# Patient Record
Sex: MALE | Race: WHITE
[De-identification: names, ages, dates, MRNs, and addresses within clinical notes are randomized per-mention and may not be internally consistent; named-entity substitution may affect disease eponyms.]

---

## 2022-02-01 ENCOUNTER — HOSPITAL ENCOUNTER (OUTPATIENT)
Dept: HOSPITAL 79 - KOH-I | Age: 52
End: 2022-02-01
Payer: COMMERCIAL

## 2022-02-01 DIAGNOSIS — G89.29: ICD-10-CM

## 2022-02-01 DIAGNOSIS — M51.16: Primary | ICD-10-CM

## 2022-02-07 ENCOUNTER — HOSPITAL ENCOUNTER (OUTPATIENT)
Dept: HOSPITAL 79 - OPSV2 | Age: 52
End: 2022-02-07
Attending: ORTHOPAEDIC SURGERY
Payer: COMMERCIAL

## 2022-02-07 DIAGNOSIS — Z01.818: Primary | ICD-10-CM

## 2022-02-07 DIAGNOSIS — M51.16: ICD-10-CM

## 2022-02-07 LAB
BUN/CREATININE RATIO: 14 (ref 0–10)
HGB BLD-MCNC: 15.6 GM/DL (ref 14–17.5)
RED BLOOD COUNT: 5.26 M/UL (ref 4.2–5.5)
WHITE BLOOD COUNT: 8.8 K/UL (ref 4.5–11)

## 2022-02-13 ENCOUNTER — HOSPITAL ENCOUNTER (OUTPATIENT)
Dept: HOSPITAL 79 - LAB | Age: 52
End: 2022-02-13
Attending: ORTHOPAEDIC SURGERY
Payer: COMMERCIAL

## 2022-02-13 DIAGNOSIS — M48.061: ICD-10-CM

## 2022-02-13 DIAGNOSIS — M51.16: ICD-10-CM

## 2022-02-13 DIAGNOSIS — Z01.812: Primary | ICD-10-CM

## 2022-02-13 LAB — BUN/CREATININE RATIO: 18 (ref 0–10)

## 2022-02-14 ENCOUNTER — HOSPITAL ENCOUNTER (INPATIENT)
Dept: HOSPITAL 79 - OR | Age: 52
LOS: 2 days | Discharge: HOME | DRG: 455 | End: 2022-02-16
Attending: ORTHOPAEDIC SURGERY | Admitting: ORTHOPAEDIC SURGERY
Payer: COMMERCIAL

## 2022-02-14 VITALS — WEIGHT: 235 LBS | HEIGHT: 69 IN | BODY MASS INDEX: 34.8 KG/M2

## 2022-02-14 DIAGNOSIS — E11.40: ICD-10-CM

## 2022-02-14 DIAGNOSIS — Z88.6: ICD-10-CM

## 2022-02-14 DIAGNOSIS — Z87.891: ICD-10-CM

## 2022-02-14 DIAGNOSIS — M48.061: Primary | ICD-10-CM

## 2022-02-14 DIAGNOSIS — M51.36: ICD-10-CM

## 2022-02-14 DIAGNOSIS — E11.9: ICD-10-CM

## 2022-02-14 DIAGNOSIS — M54.16: ICD-10-CM

## 2022-02-14 DIAGNOSIS — I10: ICD-10-CM

## 2022-02-14 DIAGNOSIS — K44.9: ICD-10-CM

## 2022-02-14 DIAGNOSIS — Z88.2: ICD-10-CM

## 2022-02-14 DIAGNOSIS — Z79.4: ICD-10-CM

## 2022-02-14 DIAGNOSIS — Z98.890: ICD-10-CM

## 2022-02-14 DIAGNOSIS — Z83.3: ICD-10-CM

## 2022-02-14 DIAGNOSIS — Z82.49: ICD-10-CM

## 2022-02-14 DIAGNOSIS — Z20.822: ICD-10-CM

## 2022-02-14 DIAGNOSIS — M51.87: ICD-10-CM

## 2022-02-14 DIAGNOSIS — Z88.0: ICD-10-CM

## 2022-02-14 LAB
BUN/CREATININE RATIO: 15 (ref 0–10)
HGB BLD-MCNC: 13.2 GM/DL (ref 14–17.5)
RED BLOOD COUNT: 4.31 M/UL (ref 4.2–5.5)
WHITE BLOOD COUNT: 11.2 K/UL (ref 4.5–11)

## 2022-02-14 PROCEDURE — C1713 ANCHOR/SCREW BN/BN,TIS/BN: HCPCS

## 2022-02-14 PROCEDURE — 01NB0ZZ RELEASE LUMBAR NERVE, OPEN APPROACH: ICD-10-PCS | Performed by: ORTHOPAEDIC SURGERY

## 2022-02-14 PROCEDURE — 0SG3071 FUSION OF LUMBOSACRAL JOINT WITH AUTOLOGOUS TISSUE SUBSTITUTE, POSTERIOR APPROACH, POSTERIOR COLUMN, OPEN APPROACH: ICD-10-PCS | Performed by: ORTHOPAEDIC SURGERY

## 2022-02-14 PROCEDURE — 01NR0ZZ RELEASE SACRAL NERVE, OPEN APPROACH: ICD-10-PCS | Performed by: ORTHOPAEDIC SURGERY

## 2022-02-14 PROCEDURE — 0SG30AJ FUSION OF LUMBOSACRAL JOINT WITH INTERBODY FUSION DEVICE, POSTERIOR APPROACH, ANTERIOR COLUMN, OPEN APPROACH: ICD-10-PCS | Performed by: ORTHOPAEDIC SURGERY

## 2022-02-14 PROCEDURE — 4A11X4G MONITORING OF PERIPHERAL NERVOUS ELECTRICAL ACTIVITY, INTRAOPERATIVE, EXTERNAL APPROACH: ICD-10-PCS | Performed by: ORTHOPAEDIC SURGERY

## 2022-02-14 PROCEDURE — C1762 CONN TISS, HUMAN(INC FASCIA): HCPCS

## 2022-02-15 LAB
BUN/CREATININE RATIO: 16 (ref 0–10)
HGB BLD-MCNC: 12.5 GM/DL (ref 14–17.5)
RED BLOOD COUNT: 4.03 M/UL (ref 4.2–5.5)
WHITE BLOOD COUNT: 11.4 K/UL (ref 4.5–11)

## 2022-02-16 LAB
BUN/CREATININE RATIO: 17 (ref 0–10)
HGB BLD-MCNC: 12.2 GM/DL (ref 14–17.5)
RED BLOOD COUNT: 4.04 M/UL (ref 4.2–5.5)
WHITE BLOOD COUNT: 11 K/UL (ref 4.5–11)

## 2022-05-10 ENCOUNTER — OFFICE VISIT (OUTPATIENT)
Dept: SURGERY | Facility: CLINIC | Age: 52
End: 2022-05-10

## 2022-05-10 VITALS
BODY MASS INDEX: 34.96 KG/M2 | DIASTOLIC BLOOD PRESSURE: 96 MMHG | HEART RATE: 80 BPM | WEIGHT: 236 LBS | HEIGHT: 69 IN | SYSTOLIC BLOOD PRESSURE: 138 MMHG

## 2022-05-10 DIAGNOSIS — K21.9 GASTROESOPHAGEAL REFLUX DISEASE, UNSPECIFIED WHETHER ESOPHAGITIS PRESENT: Primary | ICD-10-CM

## 2022-05-10 PROCEDURE — 99243 OFF/OP CNSLTJ NEW/EST LOW 30: CPT | Performed by: SURGERY

## 2022-05-10 RX ORDER — PANTOPRAZOLE SODIUM 40 MG/1
40 TABLET, DELAYED RELEASE ORAL DAILY
Qty: 30 TABLET | Refills: 1 | Status: SHIPPED | OUTPATIENT
Start: 2022-05-10 | End: 2023-05-10

## 2022-05-10 RX ORDER — INSULIN GLARGINE 100 [IU]/ML
INJECTION, SOLUTION SUBCUTANEOUS
COMMUNITY
Start: 2022-03-21

## 2022-05-10 RX ORDER — LISINOPRIL 10 MG/1
TABLET ORAL
COMMUNITY
Start: 2022-04-20

## 2022-05-10 NOTE — PROGRESS NOTES
Subjective   Philip Santoyo is a 52 y.o. male is being seen for consultation today at the request of Jah Cancino MD    Philip Santoyo is a 52 y.o. male With concern for possible hiatal hernia.  No outpatient endoscopy or imaging is available or has been reported by the patient.  He smokes currently and is on no PPI therapy.  He complains of chest pain when coughing in the subcostal regions.  No dysphagia reported.    Hiatal Hernia  Associated symptoms include coughing. Pertinent negatives include no abdominal pain, chest pain, chills, fever, headaches, joint swelling, nausea, rash, sore throat, vomiting or weakness.       History reviewed. No pertinent past medical history.    History reviewed. No pertinent family history.    Social History     Socioeconomic History   • Marital status:    Tobacco Use   • Smoking status: Current Every Day Smoker     Packs/day: 0.50     Types: Cigarettes   • Smokeless tobacco: Never Used   Vaping Use   • Vaping Use: Never used   Substance and Sexual Activity   • Alcohol use: Never   • Drug use: Never       History reviewed. No pertinent surgical history.    Review of Systems   Constitutional: Negative for activity change, appetite change, chills and fever.   HENT: Negative for sore throat and trouble swallowing.    Eyes: Negative for visual disturbance.   Respiratory: Positive for cough. Negative for shortness of breath.    Cardiovascular: Negative for chest pain and palpitations.   Gastrointestinal: Negative for abdominal distention, abdominal pain, blood in stool, constipation, diarrhea, nausea and vomiting.   Endocrine: Negative for cold intolerance and heat intolerance.   Genitourinary: Negative for dysuria.   Musculoskeletal: Negative for joint swelling.   Skin: Negative for color change, rash and wound.   Allergic/Immunologic: Negative for immunocompromised state.   Neurological: Negative for dizziness, seizures, weakness and headaches.   Hematological: Negative for  "adenopathy. Does not bruise/bleed easily.   Psychiatric/Behavioral: Negative for agitation and confusion.         /96   Pulse 80   Ht 69 cm (27.17\")   Wt 107 kg (236 lb)   .85 kg/m²   Objective   Physical Exam  Constitutional:       Appearance: He is well-developed.   HENT:      Head: Normocephalic and atraumatic.   Eyes:      Conjunctiva/sclera: Conjunctivae normal.      Pupils: Pupils are equal, round, and reactive to light.   Neck:      Thyroid: No thyromegaly.      Vascular: No JVD.      Trachea: No tracheal deviation.   Cardiovascular:      Rate and Rhythm: Normal rate and regular rhythm.      Heart sounds: No murmur heard.    No friction rub. No gallop.   Pulmonary:      Effort: Pulmonary effort is normal.      Breath sounds: Normal breath sounds.   Abdominal:      General: There is no distension.      Palpations: Abdomen is soft. There is no hepatomegaly or splenomegaly.      Tenderness: There is no abdominal tenderness.      Hernia: No hernia is present.   Musculoskeletal:         General: No deformity. Normal range of motion.      Cervical back: Neck supple.   Skin:     General: Skin is warm and dry.   Neurological:      Mental Status: He is alert and oriented to person, place, and time.               Assessment   Diagnoses and all orders for this visit:    1. Gastroesophageal reflux disease, unspecified whether esophagitis present (Primary)  -     FL Esophagram Complete Single Contrast    Other orders  -     pantoprazole (Protonix) 40 MG EC tablet; Take 1 tablet by mouth Daily.  Dispense: 30 tablet; Refill: 1      Philip Santoyo is a 52 y.o. male with substernal pain with coughing and reflux.  The patient does smoke and has been counseled on encouraged on smoking cessation.  He will obtain esophagram and initiate PPI therapy.  Follow-up after imaging               "

## 2022-05-23 ENCOUNTER — HOSPITAL ENCOUNTER (OUTPATIENT)
Dept: GENERAL RADIOLOGY | Facility: HOSPITAL | Age: 52
Discharge: HOME | End: 2022-05-23
Admitting: SURGERY

## 2022-05-23 PROCEDURE — 74220 X-RAY XM ESOPHAGUS 1CNTRST: CPT

## 2022-05-23 PROCEDURE — 74220 X-RAY XM ESOPHAGUS 1CNTRST: CPT | Performed by: RADIOLOGY

## 2022-05-25 ENCOUNTER — OFFICE VISIT (OUTPATIENT)
Dept: SURGERY | Facility: CLINIC | Age: 52
End: 2022-05-25

## 2022-05-25 VITALS — WEIGHT: 236 LBS | BODY MASS INDEX: 34.96 KG/M2 | HEIGHT: 69 IN

## 2022-05-25 DIAGNOSIS — R10.30 LOWER ABDOMINAL PAIN: Primary | ICD-10-CM

## 2022-05-25 PROCEDURE — 99213 OFFICE O/P EST LOW 20 MIN: CPT

## 2022-05-25 NOTE — PROGRESS NOTES
Subjective   Philip Santoyo is a 52 y.o. male who presents today for Follow Up    Chief Complaint:    Chief Complaint   Patient presents with   • Post-op Follow-up     2 WK FOLLOW-UP         History of Present Illness:    History of Present Illness Philip is a 52-year-old male who presents for a follow-up visit.  Patient was seen by Dr. Olivia several weeks ago and started on a PPI and had an esophagram, which was normal.  Patient states that he does not have any issues with reflux.  He reports that he was originally sent here by his primary care provider for abdominal pain to his left lower quadrant and reports that he may have a hernia.  Patient states that he has cut back on smoking and is still smoking some, however, he has decreased smoking significantly.  Patient states that he has had pain consistently.  He denies any nausea or vomiting.  He states that the pain is not worse after eating.    The following portions of the patient's history were reviewed and updated as appropriate: allergies, current medications, past family history, past medical history, past social history, past surgical history and problem list.    Past Medical History:  History reviewed. No pertinent past medical history.    Social History:  Social History     Socioeconomic History   • Marital status:    Tobacco Use   • Smoking status: Current Every Day Smoker     Packs/day: 0.50     Types: Cigarettes   • Smokeless tobacco: Never Used   Vaping Use   • Vaping Use: Never used   Substance and Sexual Activity   • Alcohol use: Never   • Drug use: Never       Family History:  History reviewed. No pertinent family history.    Past Surgical History:  No past surgical history on file.    Problem List:  Patient Active Problem List   Diagnosis   • Gastroesophageal reflux disease       Allergy:   Allergies   Allergen Reactions   • Penicillins Hives   • Sulfa Antibiotics Hives        Current Medications:   Current Outpatient Medications    Medication Sig Dispense Refill   • Lantus SoloStar 100 UNIT/ML injection pen      • lisinopril (PRINIVIL,ZESTRIL) 10 MG tablet      • pantoprazole (Protonix) 40 MG EC tablet Take 1 tablet by mouth Daily. 30 tablet 1     No current facility-administered medications for this visit.       Review of Systems:    Review of Systems   Constitutional: Negative for activity change.   HENT: Negative for trouble swallowing.    Eyes: Negative for blurred vision.   Respiratory: Negative for shortness of breath.    Cardiovascular: Negative for chest pain.   Gastrointestinal: Positive for abdominal pain.   Endocrine: Negative for cold intolerance.   Genitourinary: Negative for flank pain.   Musculoskeletal: Negative for arthralgias.   Skin: Negative for bruise.   Allergic/Immunologic: Negative for environmental allergies.   Neurological: Negative for confusion.   Hematological: Negative for adenopathy.   Psychiatric/Behavioral: Negative for agitation.         Physical Exam:   Physical Exam  Constitutional:       Appearance: Normal appearance.   HENT:      Head: Normocephalic.      Right Ear: External ear normal.      Left Ear: External ear normal.      Nose: Nose normal.      Mouth/Throat:      Pharynx: Oropharynx is clear.   Eyes:      Pupils: Pupils are equal, round, and reactive to light.   Cardiovascular:      Rate and Rhythm: Normal rate and regular rhythm.      Pulses: Normal pulses.   Pulmonary:      Effort: Pulmonary effort is normal.   Abdominal:      General: Abdomen is flat.      Palpations: Abdomen is soft.   Musculoskeletal:         General: Normal range of motion.      Cervical back: Normal range of motion and neck supple.   Skin:     General: Skin is warm and dry.      Capillary Refill: Capillary refill takes less than 2 seconds.   Neurological:      Mental Status: He is alert and oriented to person, place, and time.   Psychiatric:         Mood and Affect: Mood normal.         Behavior: Behavior normal.  "        Vitals:  Height 175.3 cm (69\"), weight 107 kg (236 lb).   Body mass index is 34.85 kg/m².     Lab Results:   No results found for any previous visit.       Imaging:         Assessment & Plan   Diagnoses and all orders for this visit:    1. Lower abdominal pain (Primary)  -     CT Abdomen Pelvis Without Contrast; Future    We will obtain an CT of the abdomen and pelvis without contrast and follow-up in 2 weeks.  If scan has not been done within 2 weeks patient will reschedule appointment.  He agrees with plan.    Visit Diagnoses:    ICD-10-CM ICD-9-CM   1. Lower abdominal pain  R10.30 789.09         MEDS ORDERED DURING VISIT:  No orders of the defined types were placed in this encounter.      Return in about 2 weeks (around 6/8/2022).             This document has been electronically signed by HAMZAH Jaffe  May 25, 2022 13:24 EDT    Please note that portions of this note were completed with a voice recognition program.   "

## 2022-06-01 ENCOUNTER — HOSPITAL ENCOUNTER (OUTPATIENT)
Dept: CT IMAGING | Facility: HOSPITAL | Age: 52
Discharge: HOME | End: 2022-06-01

## 2022-06-01 DIAGNOSIS — R10.30 LOWER ABDOMINAL PAIN: ICD-10-CM

## 2022-06-01 PROCEDURE — 74176 CT ABD & PELVIS W/O CONTRAST: CPT

## 2022-06-01 PROCEDURE — 74176 CT ABD & PELVIS W/O CONTRAST: CPT | Performed by: RADIOLOGY

## 2022-06-08 ENCOUNTER — OFFICE VISIT (OUTPATIENT)
Dept: SURGERY | Facility: CLINIC | Age: 52
End: 2022-06-08

## 2022-06-08 VITALS — WEIGHT: 236 LBS | BODY MASS INDEX: 34.96 KG/M2 | HEIGHT: 69 IN

## 2022-06-08 DIAGNOSIS — R10.84 GENERALIZED ABDOMINAL PAIN: Primary | ICD-10-CM

## 2022-06-08 PROCEDURE — 99212 OFFICE O/P EST SF 10 MIN: CPT

## 2022-06-08 NOTE — PROGRESS NOTES
Subjective   Philip Santoyo is a 52 y.o. male.     History of Present Illness patient presents for follow-up visit.  He was seen 2 weeks ago with complaints of abdominal pain to left abdomen.  He was referred here for potential hiatal hernia.  He had a normal esophagram and was started on Protonix.  Patient reports that he has not taken the Protonix and has not had any issues with reflux.  Patient had a CT scan that was normal.  There were no hernias identified.  He remains complaining of abdominal pain to left abdomen.          Objective   Physical Exam abdomen round, tenderness to left abdomen.  There is no guarding noted.      Assessment & Plan   Diagnoses and all orders for this visit:    1. Generalized abdominal pain (Primary)    There is no hernia identified on CT scan.  I recommend that you continue to decrease smoking.  Follow-up with your primary care provider and you may follow-up with us if there are any changes, new or worsening symptoms.

## 2022-09-15 ENCOUNTER — TRANSCRIBE ORDERS (OUTPATIENT)
Dept: ADMINISTRATIVE | Facility: HOSPITAL | Age: 52
End: 2022-09-15

## 2022-09-15 DIAGNOSIS — I73.9 PVD (PERIPHERAL VASCULAR DISEASE): Primary | ICD-10-CM

## 2022-09-20 ENCOUNTER — HOSPITAL ENCOUNTER (OUTPATIENT)
Dept: ULTRASOUND IMAGING | Facility: HOSPITAL | Age: 52
Discharge: HOME | End: 2022-09-20
Admitting: INTERNAL MEDICINE

## 2022-09-20 DIAGNOSIS — I73.9 PVD (PERIPHERAL VASCULAR DISEASE): ICD-10-CM

## 2022-09-20 PROCEDURE — 93922 UPR/L XTREMITY ART 2 LEVELS: CPT

## 2022-09-20 PROCEDURE — 93922 UPR/L XTREMITY ART 2 LEVELS: CPT | Performed by: RADIOLOGY

## 2024-10-03 ENCOUNTER — OFFICE VISIT (OUTPATIENT)
Dept: UROLOGY | Facility: CLINIC | Age: 54
End: 2024-10-03
Payer: MEDICARE

## 2024-10-03 VITALS
BODY MASS INDEX: 37.12 KG/M2 | HEIGHT: 69 IN | WEIGHT: 250.6 LBS | DIASTOLIC BLOOD PRESSURE: 75 MMHG | HEART RATE: 80 BPM | SYSTOLIC BLOOD PRESSURE: 149 MMHG

## 2024-10-03 DIAGNOSIS — N52.9 ED (ERECTILE DYSFUNCTION) OF ORGANIC ORIGIN: Primary | ICD-10-CM

## 2024-10-03 PROCEDURE — 1159F MED LIST DOCD IN RCRD: CPT | Performed by: UROLOGY

## 2024-10-03 PROCEDURE — 99203 OFFICE O/P NEW LOW 30 MIN: CPT | Performed by: UROLOGY

## 2024-10-03 PROCEDURE — 1160F RVW MEDS BY RX/DR IN RCRD: CPT | Performed by: UROLOGY

## 2024-10-03 RX ORDER — BUSPIRONE HYDROCHLORIDE 5 MG/1
5 TABLET ORAL DAILY
COMMUNITY
Start: 2024-06-08

## 2024-10-03 RX ORDER — AMLODIPINE BESYLATE 10 MG/1
10 TABLET ORAL DAILY
COMMUNITY
Start: 2024-08-06

## 2024-10-03 RX ORDER — OXYCODONE AND ACETAMINOPHEN 10; 325 MG/1; MG/1
1 TABLET ORAL EVERY 4 HOURS PRN
COMMUNITY
Start: 2024-08-06

## 2024-10-03 RX ORDER — SEMAGLUTIDE 2.68 MG/ML
2 INJECTION, SOLUTION SUBCUTANEOUS WEEKLY
COMMUNITY
Start: 2024-08-07

## 2024-10-03 NOTE — PROGRESS NOTES
Chief Complaint:      Chief Complaint   Patient presents with    Erectile Dysfunction     New patient       HPI:   54 y.o. male referred for evaluation of erectile dysfunction.  Viagra has been unsuccessful, he is not interested in a vacuum erection device.  He has significant failed low back surgery and chronic narcotics as well as hip pain.  We discussed the next step for him would be penile injections.  Discussed the use etc.  Penile injection-the patient is interested in an attempt at a pharmacological penile injection for severe organic erectile dysfunction having failed the traditional oral therapy and having been offered a vacuum erection device.  After an appropriate informed consent including the significant risks of priapism, pain, and lack of efficacy, the penis was prepped and draped.  Appropriate anatomy was demonstrated including the dorsal nerve complex at 12 o'clock on the dorsum of the penis and the urethra at 6 o'clock.  We recommended injections between 9 o'clock and 10 o'clock on the right side and to 3 o'clock on the left side up and down the shaft into the corporal body.  We utilized a 27-gauge needle and began our dose of 0.1 mL of Trymex compound, observed for 30 minutes before its results.  We discussed the grading on a 10 scale indicating a 5 would be tumescence with poor axial rigidity.  I indicated that when the patient goes home though be about a 30-40% improvement.  It was emphasized that the erection should last no more than 2 hours and anything more than that should prompt an immediate call to the office.  We also talked about pharmacological manipulation if the erection lasts for prolonged period including oral Sudafed or terbutaline.  We talked about pharmacological reduction of the erection using phenylephrine.  The patient wishes to proceed.    Past Medical History:     No past medical history on file.    Current Meds:     Current Outpatient Medications   Medication Sig Dispense  Refill    Lantus SoloStar 100 UNIT/ML injection pen       lisinopril (PRINIVIL,ZESTRIL) 10 MG tablet        No current facility-administered medications for this visit.        Allergies:      Allergies   Allergen Reactions    Penicillins Hives    Sulfa Antibiotics Hives        Past Surgical History:     Past Surgical History:   Procedure Laterality Date    BACK SURGERY  02/01/2022    LEG TENDON SURGERY      TONSILLECTOMY         Social History:     Social History     Socioeconomic History    Marital status:    Tobacco Use    Smoking status: Every Day     Current packs/day: 0.50     Types: Cigarettes    Smokeless tobacco: Never   Vaping Use    Vaping status: Never Used   Substance and Sexual Activity    Alcohol use: Never    Drug use: Never       Family History:     Family History   Problem Relation Age of Onset    Heart disease Father        Review of Systems:     Review of Systems   Constitutional: Negative.    HENT: Negative.     Eyes: Negative.    Respiratory: Negative.     Cardiovascular: Negative.    Gastrointestinal: Negative.    Endocrine: Negative.    Musculoskeletal: Negative.    Allergic/Immunologic: Negative.    Neurological: Negative.    Hematological: Negative.    Psychiatric/Behavioral: Negative.         Physical Exam:     Physical Exam  Vitals and nursing note reviewed.   Constitutional:       Appearance: He is well-developed.   HENT:      Head: Normocephalic and atraumatic.   Eyes:      Conjunctiva/sclera: Conjunctivae normal.      Pupils: Pupils are equal, round, and reactive to light.   Cardiovascular:      Rate and Rhythm: Normal rate and regular rhythm.      Heart sounds: Normal heart sounds.   Pulmonary:      Effort: Pulmonary effort is normal.      Breath sounds: Normal breath sounds.   Abdominal:      General: Bowel sounds are normal.      Palpations: Abdomen is soft.   Genitourinary:     Penis: Normal.    Musculoskeletal:         General: Normal range of motion.      Cervical back:  Normal range of motion.   Skin:     General: Skin is warm and dry.   Neurological:      Mental Status: He is alert and oriented to person, place, and time.      Deep Tendon Reflexes: Reflexes are normal and symmetric.   Psychiatric:         Behavior: Behavior normal.         Thought Content: Thought content normal.         Judgment: Judgment normal.         I have reviewed the following portions of the patient's history: Allergies, current medications, past family history, past medical history, past social history, past surgical history, problem list, and ROS and confirm it is accurate.    Recent Image (CT and/or KUB):      CT Abdomen and Pelvis: No results found for this or any previous visit.       CT Stone Protocol: No results found for this or any previous visit.       KUB: No results found for this or any previous visit.       Labs (past 3 months):      No visits with results within 3 Month(s) from this visit.   Latest known visit with results is:   No results found for any previous visit.        Procedure:       Assessment/Plan:   Penile injection-the patient is interested in an attempt at a pharmacological penile injection for severe organic erectile dysfunction having failed the traditional oral therapy and having been offered a vacuum erection device.  After an appropriate informed consent including the significant risks of priapism, pain, and lack of efficacy, the penis was prepped and draped.  Appropriate anatomy was demonstrated including the dorsal nerve complex at 12 o'clock on the dorsum of the penis and the urethra at 6 o'clock.  We recommended injections between 9 o'clock and 10 o'clock on the right side and to 3 o'clock on the left side up and down the shaft into the corporal body.  We utilized a 27-gauge needle and began our dose of 0.1 mL of Trymex compound, observed for 30 minutes before its results.  We discussed the grading on a 10 scale indicating a 5 would be tumescence with poor axial  rigidity.  I indicated that when the patient goes home though be about a 30-40% improvement.  It was emphasized that the erection should last no more than 2 hours and anything more than that should prompt an immediate call to the office.  We also talked about pharmacological manipulation if the erection lasts for prolonged period including oral Sudafed or terbutaline.  We talked about pharmacological reduction of the erection using phenylephrine.  The patient wishes to proceed.  He has failed Viagra and a vacuum erection device he has diabetes as well as other numerous risk factors for the development of erectile dysfunction        This document has been electronically signed by PIPO RICHTER MD October 3, 2024 11:09 EDT    Dictated Utilizing Dragon Dictation: Part of this note may be an electronic transcription/translation of spoken language to printed text using the Dragon Dictation System.

## 2024-10-13 PROBLEM — N52.9 ED (ERECTILE DYSFUNCTION) OF ORGANIC ORIGIN: Status: ACTIVE | Noted: 2024-10-13

## 2024-10-18 ENCOUNTER — TELEPHONE (OUTPATIENT)
Dept: UROLOGY | Facility: CLINIC | Age: 54
End: 2024-10-18
Payer: MEDICARE

## 2024-10-18 NOTE — TELEPHONE ENCOUNTER
Called patient to let him know we haven't not received his Trimpex and I told him I would route his message to alethea to check on the status. Patient verbalized understanding.

## 2024-10-18 NOTE — TELEPHONE ENCOUNTER
Patient called wanting to check the status of his trimix, he said he hadn't heard anything and would like a call back please.